# Patient Record
Sex: MALE | Race: WHITE | NOT HISPANIC OR LATINO | Employment: OTHER | URBAN - METROPOLITAN AREA
[De-identification: names, ages, dates, MRNs, and addresses within clinical notes are randomized per-mention and may not be internally consistent; named-entity substitution may affect disease eponyms.]

---

## 2017-10-31 ENCOUNTER — CLINICAL SUPPORT (OUTPATIENT)
Dept: FAMILY MEDICINE | Facility: CLINIC | Age: 82
End: 2017-10-31
Payer: MEDICARE

## 2017-10-31 DIAGNOSIS — Z23 NEED FOR PROPHYLACTIC VACCINATION AND INOCULATION AGAINST INFLUENZA: Primary | ICD-10-CM

## 2017-10-31 PROCEDURE — G0008 ADMIN INFLUENZA VIRUS VAC: HCPCS | Mod: S$GLB,,, | Performed by: FAMILY MEDICINE

## 2017-10-31 PROCEDURE — 90662 IIV NO PRSV INCREASED AG IM: CPT | Mod: S$GLB,,, | Performed by: FAMILY MEDICINE

## 2017-11-14 ENCOUNTER — OFFICE VISIT (OUTPATIENT)
Dept: FAMILY MEDICINE | Facility: CLINIC | Age: 82
End: 2017-11-14
Payer: MEDICARE

## 2017-11-14 VITALS
OXYGEN SATURATION: 98 % | SYSTOLIC BLOOD PRESSURE: 130 MMHG | WEIGHT: 125 LBS | HEART RATE: 76 BPM | TEMPERATURE: 98 F | HEIGHT: 65 IN | BODY MASS INDEX: 20.83 KG/M2 | DIASTOLIC BLOOD PRESSURE: 82 MMHG

## 2017-11-14 DIAGNOSIS — Z00.00 ROUTINE GENERAL MEDICAL EXAMINATION AT A HEALTH CARE FACILITY: Primary | ICD-10-CM

## 2017-11-14 DIAGNOSIS — J30.1 SEASONAL ALLERGIC RHINITIS DUE TO POLLEN, UNSPECIFIED CHRONICITY: ICD-10-CM

## 2017-11-14 PROCEDURE — 99397 PER PM REEVAL EST PAT 65+ YR: CPT | Mod: S$GLB,,, | Performed by: FAMILY MEDICINE

## 2017-11-18 NOTE — PROGRESS NOTES
Patient ID: Gonzalo Rivera is a 81 y.o. male.    Chief Complaint: Annual Exam    HPI      Gonzalo Rivera is a 81 y.o. male. here for annual exam.   No current complaints.  Continues to cut his grass and do things around the house without problems.        Review of Symptoms    Constitutional: Negative.    HENT: Negative.   except decreased hearing  Eyes: Negative.    Respiratory: Negative.    Cardiovascular: Negative.    Gastrointestinal: Negative.    Endocrine: Negative.    Genitourinary: Negative.    Musculoskeletal: Negative.    Skin: Negative.    Allergic/Immunologic: Negative.    Neurological: Negative.    Hematological: Negative.    Psychiatric/Behavioral: Negative.      Except as above in HPI        Physical  Exam    Constitutional:  Oriented to person, place, and time. Appears well-developed and well-nourished.  appears stated age    HENT:   Head: Normocephalic and atraumatic.     Right Ear: Tympanic membrane, external ear and ear canal normal.     Left Ear: Tympanic membrane, external ear and ear canal normal except  Cerumen in the ear    Nose: Nose normal. No rhinorrhea or nasal deformity.     Mouth/Throat: Uvula is midline, oropharynx is clear and moist and mucous membranes are normal.      Eyes: Conjunctivae are normal. Right eye exhibits no discharge. Left eye exhibits no discharge. No scleral icterus.     Neck:  No JVD present. No tracheal deviation  [x]  Neck supple.   []  No Carotid bruit    Cardiovascular: Normal rate, regular rhythm and normal heart sounds.      Pulmonary/Chest: Effort normal and breath sounds normal. No stridor. No respiratory distress. No wheezes. No rales.      Musculoskeletal: Normal range of motion. No edema or tenderness.   No deformity     Lymphadenopathy:  No cervical adenopathy.     Neurological:  Alert and oriented to person, place, and time. Coordination normal.     Skin: Skin is warm and dry. No rash noted.     Psychiatric: Normal mood and affect. Speech is normal and  behavior is normal. Judgment and thought content normal.     Complete Blood Count  Lab Results   Component Value Date    RBC 5.19 06/15/2015    HGB 15.4 06/15/2015    HCT 46.1 06/15/2015    MCV 89 06/15/2015    MCH 29.7 06/15/2015    MCHC 33.4 06/15/2015    RDW 14.3 06/15/2015     06/15/2015    MPV 8.9 (L) 06/15/2015    GRAN 7.8 (H) 06/15/2015    GRAN 63.7 06/15/2015    LYMPH 3.0 06/15/2015    LYMPH 24.2 06/15/2015    MONO 1.0 06/15/2015    MONO 8.1 06/15/2015    EOS 0.4 06/15/2015    BASO 0.04 06/15/2015    EOSINOPHIL 3.1 06/15/2015    BASOPHIL 0.3 06/15/2015    DIFFMETHOD Automated 06/15/2015       Comprehensive Metabolic Panel  No results found for: GLU, BUN, CREATININE, NA, K, CL, PROT, ALBUMIN, BILITOT, AST, ALKPHOS, CO2, ALT, ANIONGAP, EGFRNONAA, ESTGFRAFRICA    TSH  No results found for: TSH    Assessment / Plan:      ICD-10-CM ICD-9-CM   1. Routine general medical examination at a health care facility Z00.00 V70.0   2. Seasonal allergic rhinitis due to pollen, unspecified chronicity J30.1 477.0     Routine general medical examination at a health care facility    Seasonal allergic rhinitis due to pollen, unspecified chronicity      Flush cerumen with water    Discussed how to stay healthy including: diet, exercise, refraining from smoking and discussed screening exams / tests needed for age, sex and family Hx.

## 2018-10-24 ENCOUNTER — CLINICAL SUPPORT (OUTPATIENT)
Dept: FAMILY MEDICINE | Facility: CLINIC | Age: 83
End: 2018-10-24
Payer: MEDICARE

## 2018-10-24 PROCEDURE — G0008 ADMIN INFLUENZA VIRUS VAC: HCPCS | Mod: S$GLB,,, | Performed by: FAMILY MEDICINE

## 2018-10-24 PROCEDURE — 90662 FLU VACCINE - HIGH DOSE (65+) PRESERVATIVE FREE IM: ICD-10-PCS | Mod: S$GLB,,, | Performed by: FAMILY MEDICINE

## 2018-10-24 PROCEDURE — G0008 FLU VACCINE - HIGH DOSE (65+) PRESERVATIVE FREE IM: ICD-10-PCS | Mod: S$GLB,,, | Performed by: FAMILY MEDICINE

## 2018-10-24 PROCEDURE — 90662 IIV NO PRSV INCREASED AG IM: CPT | Mod: S$GLB,,, | Performed by: FAMILY MEDICINE

## 2018-11-13 ENCOUNTER — OFFICE VISIT (OUTPATIENT)
Dept: FAMILY MEDICINE | Facility: CLINIC | Age: 83
End: 2018-11-13
Payer: MEDICARE

## 2018-11-13 VITALS
HEIGHT: 65 IN | TEMPERATURE: 98 F | HEART RATE: 78 BPM | OXYGEN SATURATION: 98 % | BODY MASS INDEX: 20.55 KG/M2 | SYSTOLIC BLOOD PRESSURE: 124 MMHG | DIASTOLIC BLOOD PRESSURE: 78 MMHG | WEIGHT: 123.38 LBS

## 2018-11-13 DIAGNOSIS — R29.898 LEG WEAKNESS, BILATERAL: ICD-10-CM

## 2018-11-13 DIAGNOSIS — Z00.00 ROUTINE GENERAL MEDICAL EXAMINATION AT A HEALTH CARE FACILITY: Primary | ICD-10-CM

## 2018-11-13 PROCEDURE — 99397 PER PM REEVAL EST PAT 65+ YR: CPT | Mod: S$GLB,,, | Performed by: FAMILY MEDICINE

## 2018-11-13 NOTE — PROGRESS NOTES
Patient ID: Gonzalo Rivera is a 82 y.o. male.    Chief Complaint: Annual Exam    HPI     Gonzalo Rivera is a 82 y.o. male. here for annual exam. He fells fine but co of legs being weak when he walks a long distance.  He does NOT co of pain but weakness.  NO co of neuro or vascular claudication symptoms.    Review of Symptoms    Constitutional: Negative.    HENT: Negative.    Eyes: Negative.    Respiratory: Negative.    Cardiovascular: Negative.    Gastrointestinal: Negative.    Endocrine: Negative.    Genitourinary: Negative.    Musculoskeletal: Negative except as above  Skin: Negative.    Allergic/Immunologic: Negative.    Neurological: Negative.    Hematological: Negative.    Psychiatric/Behavioral: Negative.      Except as above in HPI    No current outpatient medications on file prior to visit.     No current facility-administered medications on file prior to visit.          Physical  Exam    Vitals:    11/13/18 0932   BP: 124/78   Pulse: 78   Temp: 98.3 °F (36.8 °C)         Constitutional:  Oriented to person, place, and time. Appears well-developed and well-nourished.     HENT:   Head: Normocephalic and atraumatic.     Right Ear: Tympanic membrane, ear canal and External ear normal     Left Ear: Tympanic membrane, ear canal and External ear normal      Nose: Nose normal. No rhinorrhea or nasal deformity.     Mouth/Throat: Uvula is midline, oropharynx is clear and mucous membranes are normal.     Eyes: Conjunctivae are normal. Right eye exhibits no discharge. Left eye exhibits no  discharge. No scleral icterus.     Neck:  No JVD present. No tracheal deviation  [x]  Neck supple.   Carotid Arteries  []  No Bruit    Cardiovascular:  Regular rate and rhythm with normal S1 and S2     Pulmonary/Chest:   Clear to auscultation bilaterally without wheezes, rhonchi or rales    Abdominal: Soft. No distension and no mass.  No tenderness. No rebound and No guarding.     Musculoskeletal: Normal range of motion. No edema or  tenderness.   No deformity     Lymphadenopathy:   []  No cervical adenopathy.  []  No inguinal adenopathy    Neurological:  Alert and oriented to person, place, and time. Coordination normal.     Skin: Skin is warm and dry. No rash noted. No bruising     Psychiatric: Normal mood and affect. Speech is normal and behavior is normal. Judgment and thought content normal.       Assessment / Plan:    No diagnosis found.  There are no diagnoses linked to this encounter.      Discussed how to stay healthy including: diet, exercise, refraining from smoking and discussed screening exams / tests needed for age, sex and family Hx.

## 2019-10-29 ENCOUNTER — OFFICE VISIT (OUTPATIENT)
Dept: FAMILY MEDICINE | Facility: CLINIC | Age: 84
End: 2019-10-29
Payer: MEDICARE

## 2019-10-29 ENCOUNTER — LAB VISIT (OUTPATIENT)
Dept: LAB | Facility: HOSPITAL | Age: 84
End: 2019-10-29
Attending: FAMILY MEDICINE
Payer: MEDICARE

## 2019-10-29 VITALS
BODY MASS INDEX: 20.03 KG/M2 | HEIGHT: 65 IN | WEIGHT: 120.25 LBS | OXYGEN SATURATION: 96 % | HEART RATE: 114 BPM | TEMPERATURE: 98 F

## 2019-10-29 DIAGNOSIS — R42 DIZZINESS: ICD-10-CM

## 2019-10-29 DIAGNOSIS — R42 DIZZINESS: Primary | ICD-10-CM

## 2019-10-29 DIAGNOSIS — R00.2 PALPITATION: ICD-10-CM

## 2019-10-29 DIAGNOSIS — R29.6 FALLS FREQUENTLY: ICD-10-CM

## 2019-10-29 DIAGNOSIS — R26.81 UNSTEADY GAIT: ICD-10-CM

## 2019-10-29 LAB
ALBUMIN SERPL BCP-MCNC: 4.6 G/DL (ref 3.5–5.2)
ALP SERPL-CCNC: 101 U/L (ref 38–126)
ALT SERPL W/O P-5'-P-CCNC: 16 U/L (ref 10–44)
ANION GAP SERPL CALC-SCNC: 11 MMOL/L (ref 8–16)
AST SERPL-CCNC: 22 U/L (ref 15–46)
BASOPHILS # BLD AUTO: 0.05 K/UL (ref 0–0.2)
BASOPHILS NFR BLD: 0.4 % (ref 0–1.9)
BILIRUB SERPL-MCNC: 0.8 MG/DL (ref 0.1–1)
BUN SERPL-MCNC: 16 MG/DL (ref 2–20)
CALCIUM SERPL-MCNC: 10.4 MG/DL (ref 8.7–10.5)
CHLORIDE SERPL-SCNC: 101 MMOL/L (ref 95–110)
CO2 SERPL-SCNC: 30 MMOL/L (ref 23–29)
CREAT SERPL-MCNC: 1.17 MG/DL (ref 0.5–1.4)
DIFFERENTIAL METHOD: ABNORMAL
EOSINOPHIL # BLD AUTO: 0.2 K/UL (ref 0–0.5)
EOSINOPHIL NFR BLD: 1.9 % (ref 0–8)
ERYTHROCYTE [DISTWIDTH] IN BLOOD BY AUTOMATED COUNT: 14.9 % (ref 11.5–14.5)
EST. GFR  (AFRICAN AMERICAN): >60 ML/MIN/1.73 M^2
EST. GFR  (NON AFRICAN AMERICAN): 57.7 ML/MIN/1.73 M^2
GLUCOSE SERPL-MCNC: 108 MG/DL (ref 70–110)
HCT VFR BLD AUTO: 49.9 % (ref 40–54)
HGB BLD-MCNC: 15.8 G/DL (ref 14–18)
LYMPHOCYTES # BLD AUTO: 2.3 K/UL (ref 1–4.8)
LYMPHOCYTES NFR BLD: 19.2 % (ref 18–48)
MCH RBC QN AUTO: 28.8 PG (ref 27–31)
MCHC RBC AUTO-ENTMCNC: 31.7 G/DL (ref 32–36)
MCV RBC AUTO: 91 FL (ref 82–98)
MONOCYTES # BLD AUTO: 0.9 K/UL (ref 0.3–1)
MONOCYTES NFR BLD: 7.8 % (ref 4–15)
NEUTROPHILS # BLD AUTO: 8.3 K/UL (ref 1.8–7.7)
NEUTROPHILS NFR BLD: 70.7 % (ref 38–73)
PLATELET # BLD AUTO: 363 K/UL (ref 150–350)
PMV BLD AUTO: 8.7 FL (ref 9.2–12.9)
POTASSIUM SERPL-SCNC: 5.7 MMOL/L (ref 3.5–5.1)
PROT SERPL-MCNC: 8 G/DL (ref 6–8.4)
RBC # BLD AUTO: 5.48 M/UL (ref 4.6–6.2)
SODIUM SERPL-SCNC: 142 MMOL/L (ref 136–145)
TSH SERPL DL<=0.005 MIU/L-ACNC: 1.04 UIU/ML (ref 0.4–4)
WBC # BLD AUTO: 11.8 K/UL (ref 3.9–12.7)

## 2019-10-29 PROCEDURE — 93010 ELECTROCARDIOGRAM REPORT: CPT | Mod: S$GLB,,, | Performed by: INTERNAL MEDICINE

## 2019-10-29 PROCEDURE — 85025 COMPLETE CBC W/AUTO DIFF WBC: CPT | Mod: PO

## 2019-10-29 PROCEDURE — 93005 EKG 12-LEAD: ICD-10-PCS | Mod: S$GLB,,, | Performed by: FAMILY MEDICINE

## 2019-10-29 PROCEDURE — 1100F PR PT FALLS ASSESS DOC 2+ FALLS/FALL W/INJURY/YR: ICD-10-PCS | Mod: CPTII,S$GLB,, | Performed by: FAMILY MEDICINE

## 2019-10-29 PROCEDURE — 1100F PTFALLS ASSESS-DOCD GE2>/YR: CPT | Mod: CPTII,S$GLB,, | Performed by: FAMILY MEDICINE

## 2019-10-29 PROCEDURE — 80053 COMPREHEN METABOLIC PANEL: CPT | Mod: PO

## 2019-10-29 PROCEDURE — 93010 EKG 12-LEAD: ICD-10-PCS | Mod: S$GLB,,, | Performed by: INTERNAL MEDICINE

## 2019-10-29 PROCEDURE — 84443 ASSAY THYROID STIM HORMONE: CPT | Mod: PO

## 2019-10-29 PROCEDURE — 99214 OFFICE O/P EST MOD 30 MIN: CPT | Mod: S$GLB,,, | Performed by: FAMILY MEDICINE

## 2019-10-29 PROCEDURE — 93005 ELECTROCARDIOGRAM TRACING: CPT | Mod: S$GLB,,, | Performed by: FAMILY MEDICINE

## 2019-10-29 PROCEDURE — 3288F FALL RISK ASSESSMENT DOCD: CPT | Mod: CPTII,S$GLB,, | Performed by: FAMILY MEDICINE

## 2019-10-29 PROCEDURE — 99214 PR OFFICE/OUTPT VISIT, EST, LEVL IV, 30-39 MIN: ICD-10-PCS | Mod: S$GLB,,, | Performed by: FAMILY MEDICINE

## 2019-10-29 PROCEDURE — 3288F PR FALLS RISK ASSESSMENT DOCUMENTED: ICD-10-PCS | Mod: CPTII,S$GLB,, | Performed by: FAMILY MEDICINE

## 2019-10-29 PROCEDURE — 36415 COLL VENOUS BLD VENIPUNCTURE: CPT | Mod: PO

## 2019-10-29 NOTE — PROGRESS NOTES
VO per Dr Rissa fontaine to  Order HH.  Orders placed for Home Health.  Placed in folder for PHN approval.

## 2019-10-29 NOTE — PROGRESS NOTES
"Chief Complaint  Chief Complaint   Patient presents with    Annual Exam       HPI  Gonzalo Rivera is a 83 y.o. male with multiple medical diagnoses as listed in the medical history and problem list that presents for an annual exam.  Initially he denies any problems.  Complete ROS was negative.  Then as we were wrapping up his visit he tells me that he is falling.  He says that he frequently feels "off balance"  This happens all the time during the day.  He continues to denies chest pain or SOB or palpitations.  When I talked to him about doing some labs he was resistant, but agreed in the end.  Overall, though, he is a poor historian and I doubt that I am getting a complete picture of this issue.  It is also noted that he was incontinent of urine twice during his visit.       PAST MEDICAL HISTORY:  History reviewed. No pertinent past medical history.    PAST SURGICAL HISTORY:  Past Surgical History:   Procedure Laterality Date    TONSILLECTOMY         SOCIAL HISTORY:  Social History     Socioeconomic History    Marital status:      Spouse name: Not on file    Number of children: Not on file    Years of education: Not on file    Highest education level: Not on file   Occupational History    Not on file   Social Needs    Financial resource strain: Not on file    Food insecurity:     Worry: Not on file     Inability: Not on file    Transportation needs:     Medical: Not on file     Non-medical: Not on file   Tobacco Use    Smoking status: Current Every Day Smoker    Smokeless tobacco: Never Used   Substance and Sexual Activity    Alcohol use: No    Drug use: Not on file    Sexual activity: Not on file   Lifestyle    Physical activity:     Days per week: Not on file     Minutes per session: Not on file    Stress: Not at all   Relationships    Social connections:     Talks on phone: Not on file     Gets together: Not on file     Attends [employer] service: Not on file     Active member of club or " "organization: Not on file     Attends meetings of clubs or organizations: Not on file     Relationship status: Not on file   Other Topics Concern    Not on file   Social History Narrative    Not on file       FAMILY HISTORY:  Family History   Problem Relation Age of Onset    Diabetes Daughter        ALLERGIES AND MEDICATIONS: updated and reviewed.  Review of patient's allergies indicates:   Allergen Reactions    Pcn [penicillins]      No current outpatient medications on file.     No current facility-administered medications for this visit.          ROS  Review of Systems   Constitutional: Negative for activity change, appetite change, chills and fatigue.   HENT: Negative for congestion, ear discharge, hearing loss, mouth sores, rhinorrhea, sinus pain and trouble swallowing.    Eyes: Negative for photophobia, pain, discharge and visual disturbance.   Respiratory: Negative for cough, chest tightness, shortness of breath and wheezing.    Cardiovascular: Negative for chest pain, palpitations and leg swelling.   Gastrointestinal: Negative for abdominal distention, abdominal pain, blood in stool, constipation, diarrhea, nausea and vomiting.   Genitourinary: Negative for difficulty urinating, dysuria and flank pain.   Musculoskeletal: Negative for arthralgias, back pain, gait problem, joint swelling and myalgias.   Skin: Negative for color change and rash.   Neurological: Positive for dizziness and weakness. Negative for tremors, speech difficulty, light-headedness, numbness and headaches.   Psychiatric/Behavioral: Negative for behavioral problems, confusion, hallucinations, sleep disturbance and suicidal ideas.           PHYSICAL EXAM  Vitals:    10/29/19 1145   Pulse: (!) 114   Temp: 97.6 °F (36.4 °C)   TempSrc: Oral   SpO2: 96%   Weight: 54.5 kg (120 lb 4.2 oz)   Height: 5' 5" (1.651 m)    Body mass index is 20.01 kg/m².  Weight: 54.5 kg (120 lb 4.2 oz)   Height: 5' 5" (165.1 cm)     Physical Exam   Constitutional: " He is oriented to person, place, and time. He appears well-developed. No distress.   HENT:   Head: Normocephalic.   Right Ear: External ear normal.   Left Ear: External ear normal.   Mouth/Throat: No oropharyngeal exudate.   Eyes: Conjunctivae are normal. Right eye exhibits no discharge. Left eye exhibits no discharge.   Neck: Normal range of motion. Neck supple. No thyromegaly present.   Cardiovascular: Normal rate and regular rhythm. Exam reveals no friction rub.   No murmur heard.  Pulmonary/Chest: Effort normal and breath sounds normal. No stridor. No respiratory distress. He has no wheezes.   Abdominal: Soft. Bowel sounds are normal. He exhibits no distension. There is no tenderness. There is no guarding.   Musculoskeletal: Normal range of motion. He exhibits no edema or deformity.   Neurological: He is alert and oriented to person, place, and time. No cranial nerve deficit. Coordination normal.   Skin: Skin is warm. No rash noted. He is not diaphoretic. No erythema. No pallor.   Psychiatric: He has a normal mood and affect. Thought content normal.   Nursing note and vitals reviewed.        Health Maintenance       Date Due Completion Date    Shingles Vaccine (2 of 3) 10/29/2020 (Originally 12/26/2017) 10/31/2017    Lipid Panel 05/29/2020 5/29/2015    TETANUS VACCINE 10/31/2027 10/31/2017            Assessment & Plan      Gonzalo was seen today for annual exam.    Diagnoses and all orders for this visit:    Dizziness  -     CBC auto differential; Future  -     Comprehensive metabolic panel; Future  -     IN OFFICE EKG 12-LEAD (to [place])  -     Ambulatory referral to Home Health    Palpitation  -     TSH; Future  -     Ambulatory referral to Home Health    Unsteady gait  -     Ambulatory referral to Home Health    Falls frequently  -     Ambulatory referral to Home Health          Follow-up: No follow-ups on file.

## 2019-11-04 ENCOUNTER — TELEPHONE (OUTPATIENT)
Dept: FAMILY MEDICINE | Facility: CLINIC | Age: 84
End: 2019-11-04

## 2019-11-04 NOTE — TELEPHONE ENCOUNTER
----- Message from Gema Parson sent at 11/4/2019 12:02 PM CST -----  Contact: Daughter Asuncion Wilson [phone]  Needs to speak you about some issues with her dad's appointments. Please advise

## 2019-11-04 NOTE — TELEPHONE ENCOUNTER
----- Message from Ivelisse Christopher sent at 11/4/2019  3:48 PM CST -----  Contact: [facility]/[phone] ext [phone]  Home Health nurse is reporting that the patient refused home health. Thanks

## 2019-11-04 NOTE — TELEPHONE ENCOUNTER
I spoke to Asuncion and she states the pt only wants to see . He will not come in to see  on 11/12/19. Asuncion would like a call from [facility] to change his appointment to .

## 2019-11-14 ENCOUNTER — TELEPHONE (OUTPATIENT)
Dept: FAMILY MEDICINE | Facility: CLINIC | Age: 84
End: 2019-11-14

## 2019-11-14 ENCOUNTER — OFFICE VISIT (OUTPATIENT)
Dept: FAMILY MEDICINE | Facility: CLINIC | Age: 84
End: 2019-11-14
Payer: MEDICARE

## 2019-11-14 ENCOUNTER — LAB VISIT (OUTPATIENT)
Dept: LAB | Facility: HOSPITAL | Age: 84
End: 2019-11-14
Attending: FAMILY MEDICINE
Payer: MEDICARE

## 2019-11-14 VITALS
WEIGHT: 266.75 LBS | SYSTOLIC BLOOD PRESSURE: 136 MMHG | HEIGHT: 65 IN | DIASTOLIC BLOOD PRESSURE: 74 MMHG | OXYGEN SATURATION: 98 % | TEMPERATURE: 99 F | BODY MASS INDEX: 44.44 KG/M2 | HEART RATE: 114 BPM

## 2019-11-14 DIAGNOSIS — Z00.00 ROUTINE HEALTH MAINTENANCE: Primary | ICD-10-CM

## 2019-11-14 DIAGNOSIS — E87.5 HYPERKALEMIA: ICD-10-CM

## 2019-11-14 LAB
ANION GAP SERPL CALC-SCNC: 10 MMOL/L (ref 8–16)
BUN SERPL-MCNC: 21 MG/DL (ref 2–20)
CALCIUM SERPL-MCNC: 9.7 MG/DL (ref 8.7–10.5)
CHLORIDE SERPL-SCNC: 102 MMOL/L (ref 95–110)
CO2 SERPL-SCNC: 27 MMOL/L (ref 23–29)
CREAT SERPL-MCNC: 1.17 MG/DL (ref 0.5–1.4)
EST. GFR  (AFRICAN AMERICAN): >60 ML/MIN/1.73 M^2
EST. GFR  (NON AFRICAN AMERICAN): 57.7 ML/MIN/1.73 M^2
GLUCOSE SERPL-MCNC: 109 MG/DL (ref 70–110)
POTASSIUM SERPL-SCNC: 5 MMOL/L (ref 3.5–5.1)
SODIUM SERPL-SCNC: 139 MMOL/L (ref 136–145)

## 2019-11-14 PROCEDURE — 36415 COLL VENOUS BLD VENIPUNCTURE: CPT | Mod: PO

## 2019-11-14 PROCEDURE — 80048 BASIC METABOLIC PNL TOTAL CA: CPT | Mod: PO

## 2019-11-14 PROCEDURE — 99213 PR OFFICE/OUTPT VISIT, EST, LEVL III, 20-29 MIN: ICD-10-PCS | Mod: S$GLB,,, | Performed by: FAMILY MEDICINE

## 2019-11-14 PROCEDURE — 99213 OFFICE O/P EST LOW 20 MIN: CPT | Mod: S$GLB,,, | Performed by: FAMILY MEDICINE

## 2019-11-14 NOTE — TELEPHONE ENCOUNTER
YOUR POTASIUM is normal.    Most likely just a traumatic stick that made more potasium in the serum      NO  NO  NO additional testing is needed.

## 2019-11-18 NOTE — PROGRESS NOTES
" Patient ID: Gonzalo Rivera is a 83 y.o. male.    Chief Complaint: 2 week follow up    HPI      Gonzalo Rivera is a 83 y.o. male. here for annual exam.   No current complaints.  Ask about home activities-of daily living-doing well-family member helps him.  Dizziness-no recent falls-uses a cane without problems.  Able to ambulate around the house and outside.  Ask about medication-does not take any medication  Hyperkalemia-please recheck labs        Review of Symptoms    Constitutional: Negative.    HENT: Negative.    Eyes: Negative.    Respiratory: Negative.    Cardiovascular: Negative.    Gastrointestinal: Negative.    Endocrine: Negative.    Genitourinary: Negative.    Musculoskeletal: Negative.    Skin: Negative.    Allergic/Immunologic: Negative.    Neurological: Negative.    Hematological: Negative.    Psychiatric/Behavioral: Negative.      Except as above in HPI      Vitals:    11/14/19 1054   BP: 136/74   Pulse: (!) 114   Temp: 98.6 °F (37 °C)   SpO2: 98%   Weight: 121 kg (266 lb 12.1 oz)   Height: 5' 5" (1.651 m)        Physical  Exam      Constitutional:  Oriented to person, place, and time. Appears well-developed and well-nourished.   Appears stated age    HENT:   Head: Normocephalic and atraumatic.     Right Ear: Tympanic membrane, ear canal and External ear normal     Left Ear: Tympanic membrane, ear canal and External ear normal     Nose: Nose normal. No rhinorrhea or nasal deformity.     Mouth/Throat: Uvula is midline, oropharynx is clear and moist and mucous membranes are normal.      Eyes: Conjunctivae are normal. Right eye exhibits no discharge. Left eye exhibits no discharge. No scleral icterus.     Neck:  No JVD present. No tracheal deviation  [x]  Neck supple.   []  No Carotid bruit    Cardiovascular:  Regular rate and rhythm with normal S1 and S2     Pulmonary/Chest:   Clear to auscultation bilaterally without wheezes, rhonchi or rales    Musculoskeletal:  Short stride slow gait-uses " cane        Lymphadenopathy:  No cervical adenopathy.     Neurological:  Alert and oriented to person, place, and time. Coordination normal.     Skin: Skin is warm and dry. No rash noted.     Psychiatric: Normal mood and affect. Speech is normal and behavior is normal. Judgment and thought content normal.     Complete Blood Count  Lab Results   Component Value Date    RBC 5.48 10/29/2019    HGB 15.8 10/29/2019    HCT 49.9 10/29/2019    MCV 91 10/29/2019    MCH 28.8 10/29/2019    MCHC 31.7 (L) 10/29/2019    RDW 14.9 (H) 10/29/2019     (H) 10/29/2019    MPV 8.7 (L) 10/29/2019    GRAN 8.3 (H) 10/29/2019    GRAN 70.7 10/29/2019    LYMPH 2.3 10/29/2019    LYMPH 19.2 10/29/2019    MONO 0.9 10/29/2019    MONO 7.8 10/29/2019    EOS 0.2 10/29/2019    BASO 0.05 10/29/2019    EOSINOPHIL 1.9 10/29/2019    BASOPHIL 0.4 10/29/2019    DIFFMETHOD Automated 10/29/2019       Comprehensive Metabolic Panel  Lab Results   Component Value Date     11/14/2019    BUN 21 (H) 11/14/2019    CREATININE 1.17 11/14/2019     11/14/2019    K 5.0 11/14/2019     11/14/2019    PROT 8.0 10/29/2019    ALBUMIN 4.6 10/29/2019    BILITOT 0.8 10/29/2019    AST 22 10/29/2019    ALKPHOS 101 10/29/2019    CO2 27 11/14/2019    ALT 16 10/29/2019    ANIONGAP 10 11/14/2019    EGFRNONAA 57.7 (A) 11/14/2019    ESTGFRAFRICA >60.0 11/14/2019       TSH  Lab Results   Component Value Date    TSH 1.040 10/29/2019       Assessment / Plan:      ICD-10-CM ICD-9-CM   1. Routine health maintenance Z00.00 V70.0   2. Hyperkalemia E87.5 276.7     Routine health maintenance    Hyperkalemia  -     Basic metabolic panel; Future; Expected date: 11/14/2019      Discussed staying active-discussed use of equipment to keep from falling-offered assistance at home possibly home health-[facility] to evaluate home situation-declines    Discussed how to stay healthy including: diet, exercise, refraining from smoking and discussed  screening exams / tests needed for age, sex and family Hx.

## 2022-06-23 ENCOUNTER — PES CALL (OUTPATIENT)
Dept: ADMINISTRATIVE | Facility: CLINIC | Age: 87
End: 2022-06-23
Payer: MEDICARE

## 2022-09-19 ENCOUNTER — TELEPHONE (OUTPATIENT)
Dept: FAMILY MEDICINE | Facility: CLINIC | Age: 87
End: 2022-09-19
Payer: MEDICARE

## 2022-09-19 NOTE — TELEPHONE ENCOUNTER
----- Message from Melonie Martinez sent at 9/19/2022  2:21 PM CDT -----  Type:  Needs Medical Advice    Who Called:  pt granddaughter   Symptoms (please be specific):  calling to add to previous message that pt daughter is Cami Ferrera      Would the patient rather a call back or a response via [employer]?  Call   Best Call Back Number:  [phone]  Additional Information:

## 2022-09-19 NOTE — TELEPHONE ENCOUNTER
----- Message from Gisella Batres sent at 9/19/2022  9:48 AM CDT -----  Patient's granddaughter (Lata Matute) stopped by office requesting a home visit for patient. Says pt has been home bound for 2 years. Family concerned about pt's mobility, dizziness; Not on any medications; Loss of appetite; please call [phone]

## 2022-09-21 ENCOUNTER — OFFICE VISIT (OUTPATIENT)
Dept: FAMILY MEDICINE | Facility: CLINIC | Age: 87
End: 2022-09-21
Payer: MEDICARE

## 2022-09-21 DIAGNOSIS — R62.7 FAILURE TO THRIVE IN ADULT: Primary | ICD-10-CM

## 2022-09-21 PROCEDURE — 99349 HOME/RES VST EST MOD MDM 40: CPT | Mod: S$GLB,,, | Performed by: FAMILY MEDICINE

## 2022-09-21 PROCEDURE — 99349 PR HOME VISIT,ESTAB PATIENT,LEVEL III: ICD-10-PCS | Mod: S$GLB,,, | Performed by: FAMILY MEDICINE

## 2022-09-21 PROCEDURE — 99499 UNLISTED E&M SERVICE: CPT | Mod: S$GLB,,, | Performed by: FAMILY MEDICINE

## 2022-09-21 PROCEDURE — 99499 RISK ADDL DX/OHS AUDIT: ICD-10-PCS | Mod: S$GLB,,, | Performed by: FAMILY MEDICINE

## 2022-09-21 RX ORDER — MIRTAZAPINE 7.5 MG/1
7.5 TABLET, FILM COATED ORAL NIGHTLY
Qty: 30 TABLET | Refills: 0 | Status: SHIPPED | OUTPATIENT
Start: 2022-09-21 | End: 2023-09-21

## 2022-09-21 RX ORDER — AZITHROMYCIN 500 MG/1
500 TABLET, FILM COATED ORAL DAILY
Qty: 3 TABLET | Refills: 0 | Status: SHIPPED | OUTPATIENT
Start: 2022-09-21

## 2022-09-21 RX ORDER — PREDNISONE 10 MG/1
20 TABLET ORAL DAILY
Qty: 14 TABLET | Refills: 0 | Status: SHIPPED | OUTPATIENT
Start: 2022-09-21

## 2022-09-25 NOTE — PROGRESS NOTES
Patient ID: Gonzalo Rivera is a 86 y.o. male.    Chief Complaint: No chief complaint on file.    HPI      Gonzalo Rivera is a 86 y.o. male with whom I had a home visit because of global decline.  Family called because Mr. Gonzalo Rivera is losing weight and has not been eating and drinking appropriately.  Patient does not want to drink very much throughout the day maybe drinks 1/2 liter fluid each day.  Urine output has decreased and urine is more punch at file and concentrate.  Also not want to eat will have a few bites of food and then stop eating.  No complaints of any pain head chest arms or legs.  Decreased mobility where family has to help him in and out of a chair to his recliner and bed.      There were no vitals filed for this visit.         Review of Symptoms      Physical Exam    Constitutional:  Oriented to person, place, thin frail cachectic       HENT  Head: Normocephalic and atraumatic  Right Ear: External ear normal.   Left Ear: External ear normal.   Nose: External nose normal.   Mouth:  Moist mucus membranes.    Eyes:  Conjunctivae are normal. Right eye exhibits no discharge.  Left eye exhibits no discharge. No scleral icterus.  No periorbital edema    Cardiovascular:  Regular rate and rhythm with normal S1 and S2     Pulmonary/Chest:   Rhonchi bilaterally      Musculoskeletal:  Muscle wasting upper lower extremity face chest       Neurological:  Alert and oriented to person, place, and time.       Skin:   Skin is warm and dry.  No diaphoresis.   No rash noted.     Psychiatric:  Appears to be comfortable with current situation-understands that he is failing to thrive.      Complete Blood Count  Lab Results   Component Value Date    RBC 5.48 10/29/2019    HGB 15.8 10/29/2019    HCT 49.9 10/29/2019    MCV 91 10/29/2019    MCH 28.8 10/29/2019    MCHC 31.7 (L) 10/29/2019    RDW 14.9 (H) 10/29/2019     (H) 10/29/2019    MPV 8.7 (L) 10/29/2019    GRAN 8.3 (H) 10/29/2019    GRAN 70.7 10/29/2019     LYMPH 2.3 10/29/2019    LYMPH 19.2 10/29/2019    MONO 0.9 10/29/2019    MONO 7.8 10/29/2019    EOS 0.2 10/29/2019    BASO 0.05 10/29/2019    EOSINOPHIL 1.9 10/29/2019    BASOPHIL 0.4 10/29/2019    DIFFMETHOD Automated 10/29/2019       Comprehensive Metabolic Panel  No results found for: GLU, BUN, CREATININE, NA, K, CL, PROT, ALBUMIN, BILITOT, AST, ALKPHOS, CO2, ALT, ANIONGAP, EGFRNONAA, ESTGFRAFRICA    TSH  No results found for: TSH    Assessment / Plan:      ICD-10-CM ICD-9-CM   1. Failure to thrive in adult  R62.7 783.7     Failure to thrive in adult  -     Ambulatory referral/consult to Hospice; Future; Expected date: 09/30/2022    Other orders  -     azithromycin (ZITHROMAX) 500 MG tablet; Take 1 tablet (500 mg total) by mouth once daily. Medication last in the body for 10 days  Dispense: 3 tablet; Refill: 0  -     predniSONE (DELTASONE) 10 MG tablet; Take 2 tablets (20 mg total) by mouth once daily.  Dispense: 14 tablet; Refill: 0  -     mirtazapine (REMERON) 7.5 MG Tab; Take 1 tablet (7.5 mg total) by mouth every evening. Take at bedtime For apatite  Dispense: 30 tablet; Refill: 0    Elderly gentleman with failure of thrive due to inability to eat and drink to sustain himself.  Suggest hospice-family in agreement and understands.      Bronchitis-azithromycin prednisone     Anorexia the-trial of mirtazapine Remeron at night-mynor understands this may cause some confusion-understands pros and cons

## 2022-09-28 ENCOUNTER — TELEPHONE (OUTPATIENT)
Dept: FAMILY MEDICINE | Facility: CLINIC | Age: 87
End: 2022-09-28
Payer: MEDICARE

## 2022-09-28 NOTE — TELEPHONE ENCOUNTER
----- Message from Ad Mcpherson sent at 9/28/2022  3:31 PM CDT -----  Contact: Brianne bazan  .Type:  Needs Medical Advice    Who Called: Brianne  the nurse from [facility]  Would the patient rather a call back or a response via [facility]? Call back  Best Call Back Number:[phone]  Additional Information: [facility] is calling to speak to the office regarding the above patient being admit to Hospice care.

## 2022-10-14 ENCOUNTER — TELEPHONE (OUTPATIENT)
Dept: FAMILY MEDICINE | Facility: CLINIC | Age: 87
End: 2022-10-14
Payer: MEDICARE

## 2022-12-15 ENCOUNTER — PES CALL (OUTPATIENT)
Dept: ADMINISTRATIVE | Facility: CLINIC | Age: 87
End: 2022-12-15
Payer: MEDICARE